# Patient Record
Sex: MALE | Race: OTHER | NOT HISPANIC OR LATINO | URBAN - METROPOLITAN AREA
[De-identification: names, ages, dates, MRNs, and addresses within clinical notes are randomized per-mention and may not be internally consistent; named-entity substitution may affect disease eponyms.]

---

## 2018-07-12 ENCOUNTER — EMERGENCY (EMERGENCY)
Facility: HOSPITAL | Age: 48
LOS: 1 days | Discharge: HOME | End: 2018-07-12
Attending: EMERGENCY MEDICINE | Admitting: EMERGENCY MEDICINE
Payer: COMMERCIAL

## 2018-07-12 VITALS
HEART RATE: 93 BPM | RESPIRATION RATE: 16 BRPM | OXYGEN SATURATION: 95 % | SYSTOLIC BLOOD PRESSURE: 153 MMHG | DIASTOLIC BLOOD PRESSURE: 92 MMHG | WEIGHT: 214.95 LBS | HEIGHT: 67 IN | TEMPERATURE: 98 F

## 2018-07-12 VITALS
DIASTOLIC BLOOD PRESSURE: 71 MMHG | SYSTOLIC BLOOD PRESSURE: 115 MMHG | HEART RATE: 71 BPM | OXYGEN SATURATION: 98 % | TEMPERATURE: 98 F | RESPIRATION RATE: 16 BRPM

## 2018-07-12 PROCEDURE — 73030 X-RAY EXAM OF SHOULDER: CPT | Mod: 26,LT

## 2018-07-12 PROCEDURE — 99284 EMERGENCY DEPT VISIT MOD MDM: CPT

## 2018-07-12 PROCEDURE — 73030 X-RAY EXAM OF SHOULDER: CPT

## 2018-07-12 PROCEDURE — 99283 EMERGENCY DEPT VISIT LOW MDM: CPT | Mod: 25

## 2018-07-12 RX ORDER — IBUPROFEN 200 MG
600 TABLET ORAL ONCE
Qty: 0 | Refills: 0 | Status: COMPLETED | OUTPATIENT
Start: 2018-07-12 | End: 2018-07-12

## 2018-07-12 RX ADMIN — Medication 600 MILLIGRAM(S): at 14:41

## 2018-07-12 NOTE — ED PROVIDER NOTE - UPPER EXTREMITY EXAM, LEFT
TENDERNESS/+ttp L shoulder with FROM, +pain with abduction, skin intact, no swelling/erythema/ecchymosis noted, pulses and sensation intact, fingers warm& mobile, cap refilL<2sec, NVI
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
## 2019-01-07 NOTE — ED ADULT NURSE NOTE - NURSING MUSC STRENGTH
ROBERTA Ta stating we received forms, will fax back once Dr. tena signs off on them.   hand grasp, leg strength strong and equal bilaterally
